# Patient Record
Sex: FEMALE | Race: WHITE | NOT HISPANIC OR LATINO | Employment: FULL TIME | URBAN - METROPOLITAN AREA
[De-identification: names, ages, dates, MRNs, and addresses within clinical notes are randomized per-mention and may not be internally consistent; named-entity substitution may affect disease eponyms.]

---

## 2019-09-09 ENCOUNTER — APPOINTMENT (EMERGENCY)
Dept: RADIOLOGY | Facility: HOSPITAL | Age: 19
End: 2019-09-09
Payer: COMMERCIAL

## 2019-09-09 ENCOUNTER — HOSPITAL ENCOUNTER (EMERGENCY)
Facility: HOSPITAL | Age: 19
Discharge: HOME | End: 2019-09-09
Attending: EMERGENCY MEDICINE
Payer: COMMERCIAL

## 2019-09-09 VITALS
RESPIRATION RATE: 16 BRPM | BODY MASS INDEX: 18.96 KG/M2 | TEMPERATURE: 98.2 F | WEIGHT: 118 LBS | HEART RATE: 91 BPM | SYSTOLIC BLOOD PRESSURE: 126 MMHG | OXYGEN SATURATION: 100 % | DIASTOLIC BLOOD PRESSURE: 75 MMHG | HEIGHT: 66 IN

## 2019-09-09 DIAGNOSIS — R07.9 CHEST PAIN: Primary | ICD-10-CM

## 2019-09-09 LAB
ALBUMIN SERPL BCP-MCNC: 4.5 G/DL (ref 3.5–5)
ALP SERPL-CCNC: 63 U/L (ref 46–384)
ALT SERPL W P-5'-P-CCNC: 12 U/L (ref 12–78)
ANION GAP SERPL CALCULATED.3IONS-SCNC: 9 MMOL/L (ref 4–13)
AST SERPL W P-5'-P-CCNC: 15 U/L (ref 5–45)
ATRIAL RATE: 85 BPM
BASOPHILS # BLD AUTO: 0.04 THOUSANDS/ΜL (ref 0–0.1)
BASOPHILS NFR BLD AUTO: 1 % (ref 0–1)
BILIRUB SERPL-MCNC: 0.3 MG/DL (ref 0.2–1)
BUN SERPL-MCNC: 9 MG/DL (ref 5–25)
CALCIUM SERPL-MCNC: 9.7 MG/DL (ref 8.3–10.1)
CHLORIDE SERPL-SCNC: 103 MMOL/L (ref 100–108)
CO2 SERPL-SCNC: 28 MMOL/L (ref 21–32)
CREAT SERPL-MCNC: 0.76 MG/DL (ref 0.6–1.3)
DEPRECATED D DIMER PPP: 317 NG/ML (FEU)
EOSINOPHIL # BLD AUTO: 0.01 THOUSAND/ΜL (ref 0–0.61)
EOSINOPHIL NFR BLD AUTO: 0 % (ref 0–6)
ERYTHROCYTE [DISTWIDTH] IN BLOOD BY AUTOMATED COUNT: 11.9 % (ref 11.6–15.1)
EXT PREG TEST URINE: NEGATIVE
EXT. CONTROL ED NAV: NORMAL
GFR SERPL CREATININE-BSD FRML MDRD: 114 ML/MIN/1.73SQ M
GLUCOSE SERPL-MCNC: 98 MG/DL (ref 65–140)
HCT VFR BLD AUTO: 46.1 % (ref 34.8–46.1)
HGB BLD-MCNC: 15.2 G/DL (ref 11.5–15.4)
IMM GRANULOCYTES # BLD AUTO: 0.02 THOUSAND/UL (ref 0–0.2)
IMM GRANULOCYTES NFR BLD AUTO: 0 % (ref 0–2)
LYMPHOCYTES # BLD AUTO: 1.94 THOUSANDS/ΜL (ref 0.6–4.47)
LYMPHOCYTES NFR BLD AUTO: 28 % (ref 14–44)
MCH RBC QN AUTO: 31.7 PG (ref 26.8–34.3)
MCHC RBC AUTO-ENTMCNC: 33 G/DL (ref 31.4–37.4)
MCV RBC AUTO: 96 FL (ref 82–98)
MONOCYTES # BLD AUTO: 0.35 THOUSAND/ΜL (ref 0.17–1.22)
MONOCYTES NFR BLD AUTO: 5 % (ref 4–12)
NEUTROPHILS # BLD AUTO: 4.52 THOUSANDS/ΜL (ref 1.85–7.62)
NEUTS SEG NFR BLD AUTO: 66 % (ref 43–75)
NRBC BLD AUTO-RTO: 0 /100 WBCS
P AXIS: 45 DEGREES
PLATELET # BLD AUTO: 333 THOUSANDS/UL (ref 149–390)
PMV BLD AUTO: 9.8 FL (ref 8.9–12.7)
POTASSIUM SERPL-SCNC: 4.1 MMOL/L (ref 3.5–5.3)
PR INTERVAL: 140 MS
PROT SERPL-MCNC: 8.3 G/DL (ref 6.4–8.2)
QRS AXIS: 76 DEGREES
QRSD INTERVAL: 72 MS
QT INTERVAL: 346 MS
QTC INTERVAL: 411 MS
RBC # BLD AUTO: 4.79 MILLION/UL (ref 3.81–5.12)
SODIUM SERPL-SCNC: 140 MMOL/L (ref 136–145)
T WAVE AXIS: 62 DEGREES
TROPONIN I SERPL-MCNC: <0.02 NG/ML
VENTRICULAR RATE: 85 BPM
WBC # BLD AUTO: 6.88 THOUSAND/UL (ref 4.31–10.16)

## 2019-09-09 PROCEDURE — 93005 ELECTROCARDIOGRAM TRACING: CPT

## 2019-09-09 PROCEDURE — 84484 ASSAY OF TROPONIN QUANT: CPT | Performed by: EMERGENCY MEDICINE

## 2019-09-09 PROCEDURE — 93010 ELECTROCARDIOGRAM REPORT: CPT | Performed by: INTERNAL MEDICINE

## 2019-09-09 PROCEDURE — 36415 COLL VENOUS BLD VENIPUNCTURE: CPT | Performed by: EMERGENCY MEDICINE

## 2019-09-09 PROCEDURE — 85025 COMPLETE CBC W/AUTO DIFF WBC: CPT | Performed by: EMERGENCY MEDICINE

## 2019-09-09 PROCEDURE — 71046 X-RAY EXAM CHEST 2 VIEWS: CPT

## 2019-09-09 PROCEDURE — 99285 EMERGENCY DEPT VISIT HI MDM: CPT

## 2019-09-09 PROCEDURE — 99283 EMERGENCY DEPT VISIT LOW MDM: CPT | Performed by: EMERGENCY MEDICINE

## 2019-09-09 PROCEDURE — 85379 FIBRIN DEGRADATION QUANT: CPT | Performed by: EMERGENCY MEDICINE

## 2019-09-09 PROCEDURE — 81025 URINE PREGNANCY TEST: CPT | Performed by: EMERGENCY MEDICINE

## 2019-09-09 PROCEDURE — 80053 COMPREHEN METABOLIC PANEL: CPT | Performed by: EMERGENCY MEDICINE

## 2019-09-09 RX ORDER — IBUPROFEN 600 MG/1
600 TABLET ORAL ONCE
Status: DISCONTINUED | OUTPATIENT
Start: 2019-09-09 | End: 2019-09-09 | Stop reason: HOSPADM

## 2019-09-09 RX ORDER — NAPROXEN 375 MG/1
375 TABLET ORAL 2 TIMES DAILY WITH MEALS
Qty: 20 TABLET | Refills: 0 | Status: SHIPPED | OUTPATIENT
Start: 2019-09-09 | End: 2019-10-09 | Stop reason: ALTCHOICE

## 2019-09-09 RX ORDER — KETOROLAC TROMETHAMINE 30 MG/ML
15 INJECTION, SOLUTION INTRAMUSCULAR; INTRAVENOUS ONCE
Status: DISCONTINUED | OUTPATIENT
Start: 2019-09-09 | End: 2019-09-09

## 2019-09-09 NOTE — ED PROVIDER NOTES
History  Chief Complaint   Patient presents with    Chest Pain     Pt reports intermittent chest pain ongoing x2 weeks, radiating to left chest  Pt reports chest pain sudden onset after first time smoking barb  21 y/o female presents to the ED for chest pain x 2 weeks  Patient states that she has an achy pain in the center of her chest- states that initially it was intermittent and now it has been constant for 3 days  She denies any radiation of the pain  States that movement and deep breath worsen it  She reports that the pain started after smoking marijuana from a vape 2 weeks ago  She states that this is the one and only time she has smoking marijuana and used a vape  She states that she initially had cough, which has since resolved  Denies any f/c, sob, abd pain, n/v, leg swelling, urinary symtpoms, or d/c  Has not tried anything for the symptoms  She denies any cardiac hx or hx of sudden death in the family  Denies any recent travel, prlonged immobilization, recent surgery, history of PE/DVT, family history of blood clotting disorder, or hormone use  She denies any other complaints  History provided by:  Patient  Chest Pain   Pain location:  Substernal area  Pain quality: aching    Pain radiates to:  Does not radiate  Pain radiates to the back: no    Pain severity:  Mild  Onset quality:  Sudden  Duration:  2 weeks  Timing: constant for 3 days   Progression:  Unchanged  Chronicity:  New  Relieved by:  None tried  Worsened by:  Deep breathing and movement  Ineffective treatments:  None tried  Associated symptoms: cough    Associated symptoms: no abdominal pain, no anxiety, no fever, no headache, no lower extremity edema, no nausea, no numbness, no shortness of breath, not vomiting and no weakness        None       History reviewed  No pertinent past medical history  History reviewed  No pertinent surgical history  History reviewed  No pertinent family history    I have reviewed and agree with the history as documented  Social History     Tobacco Use    Smoking status: Never Smoker    Smokeless tobacco: Never Used   Substance Use Topics    Alcohol use: Not Currently    Drug use: Not Currently        Review of Systems   Constitutional: Negative for chills and fever  HENT: Negative for congestion, ear pain and sore throat  Eyes: Negative for pain and visual disturbance  Respiratory: Positive for cough  Negative for shortness of breath and wheezing  Cardiovascular: Positive for chest pain  Negative for leg swelling  Gastrointestinal: Negative for abdominal pain, diarrhea, nausea and vomiting  Genitourinary: Negative for dysuria, frequency, hematuria and urgency  Musculoskeletal: Negative for neck pain and neck stiffness  Skin: Negative for rash and wound  Neurological: Negative for weakness, numbness and headaches  Psychiatric/Behavioral: Negative for agitation and confusion  All other systems reviewed and are negative  Physical Exam  Physical Exam   Constitutional: She is oriented to person, place, and time  She appears well-developed and well-nourished  HENT:   Head: Normocephalic and atraumatic  Eyes: Pupils are equal, round, and reactive to light  EOM are normal    Neck: Normal range of motion  Neck supple  Cardiovascular: Normal rate and regular rhythm  Pulmonary/Chest: Effort normal and breath sounds normal  She has no decreased breath sounds  She has no wheezes  She has no rhonchi  She has no rales  She exhibits tenderness  Substernal chest tenderness    Abdominal: Soft  Bowel sounds are normal  She exhibits no distension  There is no tenderness  Musculoskeletal: Normal range of motion  Neurological: She is alert and oriented to person, place, and time  No focal deficits   Skin: Skin is warm and dry  Nursing note and vitals reviewed        Vital Signs  ED Triage Vitals [09/09/19 0908]   Temperature Pulse Respirations Blood Pressure SpO2   98 2 °F (36 8 °C) 105 15 139/91 100 %      Temp Source Heart Rate Source Patient Position - Orthostatic VS BP Location FiO2 (%)   Oral Monitor Sitting Left arm --      Pain Score       6           Vitals:    09/09/19 0908 09/09/19 1016   BP: 139/91 126/75   Pulse: 105 91   Patient Position - Orthostatic VS: Sitting Sitting         Visual Acuity      ED Medications  Medications   ibuprofen (MOTRIN) tablet 600 mg (600 mg Oral Not Given 9/9/19 1109)       Diagnostic Studies  Results Reviewed     Procedure Component Value Units Date/Time    D-dimer, quantitative [[id number]]  (Normal) Collected:  09/09/19 1025    Lab Status:  Final result Specimen:  Blood from Arm, Left Updated:  09/09/19 1044     D-Dimer, Quant 317 ng/ml (FEU)     POCT pregnancy, urine [[id number]]  (Normal) Resulted:  09/09/19 1021    Lab Status:  Final result Updated:  09/09/19 1021     EXT PREG TEST UR (Ref: Negative) Negative     Control Valid    Troponin I [[id number]]  (Normal) Collected:  09/09/19 0926    Lab Status:  Final result Specimen:  Blood from Arm, Right Updated:  09/09/19 0958     Troponin I <0 02 ng/mL     Comprehensive metabolic panel [[id number]]  (Abnormal) Collected:  09/09/19 0926    Lab Status:  Final result Specimen:  Blood from Arm, Right Updated:  09/09/19 0955     Sodium 140 mmol/L      Potassium 4 1 mmol/L      Chloride 103 mmol/L      CO2 28 mmol/L      ANION GAP 9 mmol/L      BUN 9 mg/dL      Creatinine 0 76 mg/dL      Glucose 98 mg/dL      Calcium 9 7 mg/dL      AST 15 U/L      ALT 12 U/L      Alkaline Phosphatase 63 U/L      Total Protein 8 3 g/dL      Albumin 4 5 g/dL      Total Bilirubin 0 30 mg/dL      eGFR 114 ml/min/1 73sq m     Narrative:       [place] guidelines for Chronic Kidney Disease (CKD):     Stage 1 with normal or high GFR (GFR > 90 mL/min/1 73 square meters)    Stage 2 Mild CKD (GFR = 60-89 mL/min/1 73 square meters)    Stage 3A Moderate CKD (GFR = 45-59 mL/min/1 73 square meters)   Stage 3B Moderate CKD (GFR = 30-44 mL/min/1 73 square meters)    Stage 4 Severe CKD (GFR = 15-29 mL/min/1 73 square meters)    Stage 5 End Stage CKD (GFR <15 mL/min/1 73 square meters)  Note: GFR calculation is accurate only with a steady state creatinine    CBC and differential [[id number]] Collected:  09/09/19 0926    Lab Status:  Final result Specimen:  Blood from Arm, Right Updated:  09/09/19 0934     WBC 6 88 Thousand/uL      RBC 4 79 Million/uL      Hemoglobin 15 2 g/dL      Hematocrit 46 1 %      MCV 96 fL      MCH 31 7 pg      MCHC 33 0 g/dL      RDW 11 9 %      MPV 9 8 fL      Platelets 256 Thousands/uL      nRBC 0 /100 WBCs      Neutrophils Relative 66 %      Immat GRANS % 0 %      Lymphocytes Relative 28 %      Monocytes Relative 5 %      Eosinophils Relative 0 %      Basophils Relative 1 %      Neutrophils Absolute 4 52 Thousands/µL      Immature Grans Absolute 0 02 Thousand/uL      Lymphocytes Absolute 1 94 Thousands/µL      Monocytes Absolute 0 35 Thousand/µL      Eosinophils Absolute 0 01 Thousand/µL      Basophils Absolute 0 04 Thousands/µL                  XR chest 2 views   ED Interpretation by Mitch Addison DO (09/09 1100)   NAP                  Procedures  ECG 12 Lead Documentation Only  Date/Time: 9/9/2019 9:30 AM  Performed by: Mitch Addison DO  Authorized by: Mitch Addison DO     Indications / Diagnosis:  Chest pain   Patient location:  ED  Previous ECG:     Previous ECG:  Unavailable  Rate:     ECG rate:  85    ECG rate assessment: normal    Rhythm:     Rhythm: sinus rhythm    Ectopy:     Ectopy: none    QRS:     QRS axis:  Normal    QRS intervals:  Normal  ST segments:     ST segments:  Normal  T waves:     T waves: normal             ED Course  ED Course as of Sep 09 1132   Mon Sep 09, 2019   1049 D-DIMER QUANTITATIVE: 317         HEART Risk Score      Most Recent Value   History  0 Filed at: 09/09/2019 1129   ECG  0 Filed at: 09/09/2019 1129   Age  0 Filed at: 09/09/2019 1129   Risk Factors  0 Filed at: 09/09/2019 1129   Troponin  0 Filed at: 09/09/2019 1129   Heart Score Risk Calculator   History  0 Filed at: 09/09/2019 1129   ECG  0 Filed at: 09/09/2019 1129   Age  0 Filed at: 09/09/2019 1129   Risk Factors  0 Filed at: 09/09/2019 1129   Troponin  0 Filed at: 09/09/2019 1129   HEART Score  0 Filed at: 09/09/2019 1129   HEART Score  0 Filed at: 09/09/2019 1129            PERC Rule for PE      Most Recent Value   PERC Rule for PE   Age >=50  0 Filed at: 09/09/2019 1129   HR >=100  1 Filed at: 09/09/2019 1129   O2 Sat on room air < 95%  0 Filed at: 09/09/2019 1129   History of PE or DVT  0 Filed at: 09/09/2019 1129   Recent trauma or surgery  0 Filed at: 09/09/2019 1129   Hemoptysis  0 Filed at: 09/09/2019 1129   Exogenous estrogen  0 Filed at: 09/09/2019 1129   Unilateral leg swelling  0 Filed at: 09/09/2019 1129   PERC Rule for PE Results  1 Filed at: 09/09/2019 1129                      MDM  Number of Diagnoses or Management Options  Chest pain: new and requires workup  Diagnosis management comments: Patient with chest pain- likely 2/2 musculoskeletal pain- will get cardiac workup, ekg, and ddimer  Will give nsaids for symptom relief  Patient reevaluated and feels improved  Patient updated on results of tests  Discharge instructions given including medications, follow-up, and return precautions  Patient demonstrates verbal understanding and agrees with plan         Amount and/or Complexity of Data Reviewed  Clinical lab tests: ordered and reviewed  Tests in the radiology section of CPT®: ordered and reviewed  Tests in the medicine section of CPT®: ordered and reviewed  Discussion of test results with the performing providers: yes  Decide to obtain previous medical records or to obtain history from someone other than the patient: yes  Obtain history from someone other than the patient: yes  Review and summarize past medical records: yes  Discuss the patient with other providers: yes  Independent visualization of images, tracings, or specimens: yes    Patient Progress  Patient progress: improved      Disposition  Final diagnoses:   Chest pain     Time reflects when diagnosis was documented in both MDM as applicable and the Disposition within this note     Time User Action Codes Description Comment    9/9/2019 11:00 AM Sarah Moreno Add [R07 9] Chest pain       ED Disposition     ED Disposition Condition Date/Time Comment    Discharge Stable Mon Sep 9, 2019 11:00 AM Eusebio Maguire discharge to home/self care  Follow-up Information     Follow up With Specialties Details Why Contact Info Additional Information    Susan Alston MD Internal Medicine Call in 1 day for follow up within 2-3 days  [address] [place] [address]  [phone]       [employer]  Call  to establish a family doctor [phone]       [facility] Emergency Medicine Go to  immediately for any new or worsening symptoms  [address] [facility] [id number]  [address] [place] [address], [address] [place] [address], Critical access hospital          There are no discharge medications for this patient  No discharge procedures on file      ED Provider  Electronically Signed by           Nessa Escudero DO  09/09/19 4591

## 2019-09-28 ENCOUNTER — HOSPITAL ENCOUNTER (EMERGENCY)
Facility: HOSPITAL | Age: 19
Discharge: HOME | End: 2019-09-29
Attending: EMERGENCY MEDICINE | Admitting: EMERGENCY MEDICINE
Payer: COMMERCIAL

## 2019-09-28 ENCOUNTER — APPOINTMENT (EMERGENCY)
Dept: RADIOLOGY | Facility: HOSPITAL | Age: 19
End: 2019-09-28
Payer: COMMERCIAL

## 2019-09-28 VITALS
WEIGHT: 123.24 LBS | DIASTOLIC BLOOD PRESSURE: 88 MMHG | HEIGHT: 66 IN | OXYGEN SATURATION: 100 % | HEART RATE: 108 BPM | RESPIRATION RATE: 16 BRPM | BODY MASS INDEX: 19.81 KG/M2 | SYSTOLIC BLOOD PRESSURE: 120 MMHG | TEMPERATURE: 97.9 F

## 2019-09-28 DIAGNOSIS — J40 BRONCHITIS: ICD-10-CM

## 2019-09-28 DIAGNOSIS — R07.89 CHEST WALL PAIN: Primary | ICD-10-CM

## 2019-09-28 LAB
ALBUMIN SERPL BCP-MCNC: 4.1 G/DL (ref 3.5–5)
ALP SERPL-CCNC: 63 U/L (ref 46–384)
ALT SERPL W P-5'-P-CCNC: 17 U/L (ref 12–78)
AMPHETAMINES SERPL QL SCN: NEGATIVE
ANION GAP SERPL CALCULATED.3IONS-SCNC: 11 MMOL/L (ref 4–13)
AST SERPL W P-5'-P-CCNC: 15 U/L (ref 5–45)
BARBITURATES UR QL: NEGATIVE
BASOPHILS # BLD AUTO: 0.06 THOUSANDS/ΜL (ref 0–0.1)
BASOPHILS NFR BLD AUTO: 0 % (ref 0–1)
BENZODIAZ UR QL: NEGATIVE
BILIRUB SERPL-MCNC: 0.3 MG/DL (ref 0.2–1)
BUN SERPL-MCNC: 11 MG/DL (ref 5–25)
CALCIUM SERPL-MCNC: 9.5 MG/DL (ref 8.3–10.1)
CHLORIDE SERPL-SCNC: 104 MMOL/L (ref 100–108)
CO2 SERPL-SCNC: 26 MMOL/L (ref 21–32)
COCAINE UR QL: NEGATIVE
CREAT SERPL-MCNC: 0.82 MG/DL (ref 0.6–1.3)
DEPRECATED D DIMER PPP: 381 NG/ML (FEU)
EOSINOPHIL # BLD AUTO: 0.06 THOUSAND/ΜL (ref 0–0.61)
EOSINOPHIL NFR BLD AUTO: 0 % (ref 0–6)
ERYTHROCYTE [DISTWIDTH] IN BLOOD BY AUTOMATED COUNT: 11.8 % (ref 11.6–15.1)
GFR SERPL CREATININE-BSD FRML MDRD: 104 ML/MIN/1.73SQ M
GLUCOSE SERPL-MCNC: 95 MG/DL (ref 65–140)
HCT VFR BLD AUTO: 40.6 % (ref 34.8–46.1)
HGB BLD-MCNC: 13.1 G/DL (ref 11.5–15.4)
IMM GRANULOCYTES # BLD AUTO: 0.05 THOUSAND/UL (ref 0–0.2)
IMM GRANULOCYTES NFR BLD AUTO: 0 % (ref 0–2)
LYMPHOCYTES # BLD AUTO: 2.83 THOUSANDS/ΜL (ref 0.6–4.47)
LYMPHOCYTES NFR BLD AUTO: 20 % (ref 14–44)
MAGNESIUM SERPL-MCNC: 2.2 MG/DL (ref 1.6–2.6)
MCH RBC QN AUTO: 31.1 PG (ref 26.8–34.3)
MCHC RBC AUTO-ENTMCNC: 32.3 G/DL (ref 31.4–37.4)
MCV RBC AUTO: 96 FL (ref 82–98)
METHADONE UR QL: NEGATIVE
MONOCYTES # BLD AUTO: 0.99 THOUSAND/ΜL (ref 0.17–1.22)
MONOCYTES NFR BLD AUTO: 7 % (ref 4–12)
NEUTROPHILS # BLD AUTO: 10.18 THOUSANDS/ΜL (ref 1.85–7.62)
NEUTS SEG NFR BLD AUTO: 73 % (ref 43–75)
NRBC BLD AUTO-RTO: 0 /100 WBCS
OPIATES UR QL SCN: NEGATIVE
PCP UR QL: NEGATIVE
PHOSPHATE SERPL-MCNC: 4 MG/DL (ref 2.7–4.5)
PLATELET # BLD AUTO: 302 THOUSANDS/UL (ref 149–390)
PMV BLD AUTO: 9.7 FL (ref 8.9–12.7)
POTASSIUM SERPL-SCNC: 4 MMOL/L (ref 3.5–5.3)
PROT SERPL-MCNC: 7.4 G/DL (ref 6.4–8.2)
RBC # BLD AUTO: 4.21 MILLION/UL (ref 3.81–5.12)
SODIUM SERPL-SCNC: 141 MMOL/L (ref 136–145)
THC UR QL: NEGATIVE
TROPONIN I SERPL-MCNC: <0.02 NG/ML
TSH SERPL DL<=0.05 MIU/L-ACNC: 1.3 UIU/ML (ref 0.46–3.98)
WBC # BLD AUTO: 14.17 THOUSAND/UL (ref 4.31–10.16)

## 2019-09-28 PROCEDURE — 84100 ASSAY OF PHOSPHORUS: CPT | Performed by: EMERGENCY MEDICINE

## 2019-09-28 PROCEDURE — 99285 EMERGENCY DEPT VISIT HI MDM: CPT

## 2019-09-28 PROCEDURE — 84443 ASSAY THYROID STIM HORMONE: CPT | Performed by: EMERGENCY MEDICINE

## 2019-09-28 PROCEDURE — 71046 X-RAY EXAM CHEST 2 VIEWS: CPT

## 2019-09-28 PROCEDURE — 83735 ASSAY OF MAGNESIUM: CPT | Performed by: EMERGENCY MEDICINE

## 2019-09-28 PROCEDURE — 85379 FIBRIN DEGRADATION QUANT: CPT | Performed by: EMERGENCY MEDICINE

## 2019-09-28 PROCEDURE — 80307 DRUG TEST PRSMV CHEM ANLYZR: CPT | Performed by: EMERGENCY MEDICINE

## 2019-09-28 PROCEDURE — 96374 THER/PROPH/DIAG INJ IV PUSH: CPT

## 2019-09-28 PROCEDURE — 85025 COMPLETE CBC W/AUTO DIFF WBC: CPT | Performed by: EMERGENCY MEDICINE

## 2019-09-28 PROCEDURE — 80053 COMPREHEN METABOLIC PANEL: CPT | Performed by: EMERGENCY MEDICINE

## 2019-09-28 PROCEDURE — 36415 COLL VENOUS BLD VENIPUNCTURE: CPT | Performed by: EMERGENCY MEDICINE

## 2019-09-28 PROCEDURE — 96361 HYDRATE IV INFUSION ADD-ON: CPT

## 2019-09-28 PROCEDURE — 99285 EMERGENCY DEPT VISIT HI MDM: CPT | Performed by: EMERGENCY MEDICINE

## 2019-09-28 PROCEDURE — 84484 ASSAY OF TROPONIN QUANT: CPT | Performed by: EMERGENCY MEDICINE

## 2019-09-28 RX ORDER — KETOROLAC TROMETHAMINE 30 MG/ML
15 INJECTION, SOLUTION INTRAMUSCULAR; INTRAVENOUS ONCE
Status: COMPLETED | OUTPATIENT
Start: 2019-09-28 | End: 2019-09-28

## 2019-09-28 RX ADMIN — SODIUM CHLORIDE 1000 ML: 0.9 INJECTION, SOLUTION INTRAVENOUS at 22:14

## 2019-09-28 RX ADMIN — KETOROLAC TROMETHAMINE 15 MG: 30 INJECTION, SOLUTION INTRAMUSCULAR at 22:15

## 2019-09-29 NOTE — ED PROVIDER NOTES
History  Chief Complaint   Patient presents with    Chest Pain     Patient c/o mid-sternal chest pain x's 3 days  Radiates down left arm  Patient notes has been experiencing chest pain for past 3 months, but recently worsened over the past 3 days  Patient is a 50-year-old female  She presents to the emergency room for evaluation of chest pain  She has had a dull left-sided chest pain for about 3 months now  She denies injury  For the last 3 days there has been a sharp pain  It is worse with deep breath  It is worse with movement  She does have a cough  No hemoptysis  The cough is not productive  No fever or chills  She denies any calf pain or unilateral leg swelling  She has no cardiac risk factors  No hypertension, diabetes or hypercholesterolemia  No cocaine use  No smoking  No significant family history of coronary artery disease  She does not have any history of thromboembolic disease  She denies any abdominal pain  Symptoms are moderate in intensity  No relieving factors  The sharp pain in her chest is intermittent  Prior to Admission Medications   Prescriptions Last Dose Informant Patient Reported? Taking?   naproxen (NAPROSYN) 375 mg tablet   No No   Sig: Take 1 tablet (375 mg total) by mouth 2 (two) times a day with meals      Facility-Administered Medications: None       History reviewed  No pertinent past medical history  History reviewed  No pertinent surgical history  History reviewed  No pertinent family history  I have reviewed and agree with the history as documented  Social History     Tobacco Use    Smoking status: Never Smoker    Smokeless tobacco: Never Used   Substance Use Topics    Alcohol use: Not Currently    Drug use: Not Currently        Review of Systems   Constitutional: Negative for chills and fever  HENT: Negative for rhinorrhea and sore throat  Eyes: Negative for pain, redness and visual disturbance     Respiratory: Positive for cough  Negative for shortness of breath  Cardiovascular: Positive for chest pain  Negative for leg swelling  Gastrointestinal: Negative for abdominal pain, diarrhea and vomiting  Endocrine: Negative for polydipsia and polyuria  Genitourinary: Negative for dysuria, frequency, hematuria, vaginal bleeding and vaginal discharge  Musculoskeletal: Negative for back pain and neck pain  Skin: Negative for rash and wound  Allergic/Immunologic: Negative for immunocompromised state  Neurological: Negative for weakness, numbness and headaches  Hematological: Does not bruise/bleed easily  Psychiatric/Behavioral: Negative for hallucinations and suicidal ideas  All other systems reviewed and are negative  Physical Exam  Physical Exam   Constitutional: She is oriented to person, place, and time  She appears well-developed and well-nourished  Non-toxic appearance  She does not appear ill  No distress  HENT:   Head: Normocephalic and atraumatic  Mouth/Throat: Oropharynx is clear and moist    Eyes: Conjunctivae are normal  Right eye exhibits no discharge  Left eye exhibits no discharge  No scleral icterus  Neck: Normal range of motion  Neck supple  Cardiovascular: Normal rate, regular rhythm, normal heart sounds and intact distal pulses  Exam reveals no gallop and no friction rub  No murmur heard  Pulmonary/Chest: Effort normal and breath sounds normal  No stridor  No respiratory distress  She has no wheezes  She has no rales  There is left chest wall tenderness  Abdominal: Soft  Bowel sounds are normal  She exhibits no distension  There is no tenderness  There is no rebound and no guarding  Musculoskeletal: Normal range of motion  She exhibits no edema, tenderness or deformity  Right lower leg: She exhibits no tenderness and no edema  Left lower leg: She exhibits no tenderness and no edema  No CVA tenderness  No calf tenderness/palpable cords     Neurological: She is alert and oriented to person, place, and time  She has normal strength  No sensory deficit  GCS eye subscore is 4  GCS verbal subscore is 5  GCS motor subscore is 6  Skin: Skin is warm and dry  No rash noted  Psychiatric: She has a normal mood and affect  Her behavior is normal    Vitals reviewed  Vital Signs  ED Triage Vitals [09/28/19 1955]   Temperature Pulse Respirations Blood Pressure SpO2   97 9 °F (36 6 °C) (!) 114 16 143/88 100 %      Temp Source Heart Rate Source Patient Position - Orthostatic VS BP Location FiO2 (%)   Oral Monitor Sitting Left arm --      Pain Score       5           Vitals:    09/28/19 1955 09/28/19 2212 09/28/19 2230   BP: 143/88 120/88    Pulse: (!) 114 (!) 106 (!) 108   Patient Position - Orthostatic VS: Sitting Lying          Visual Acuity      ED Medications  Medications   sodium chloride 0 9 % bolus 1,000 mL (1,000 mL Intravenous New Bag 9/28/19 2214)   ketorolac (TORADOL) injection 15 mg (15 mg Intravenous Given 9/28/19 2215)       Diagnostic Studies  Results Reviewed     Procedure Component Value Units Date/Time    TSH [[id number]]  (Normal) Collected:  09/28/19 2212    Lab Status:  Final result Specimen:  Blood from Arm, Right Updated:  09/28/19 2258     TSH 3RD GENERATON 1 304 uIU/mL     Narrative:       Patients undergoing fluorescein dye angiography may retain small amounts of fluorescein in the body for 48-72 hours post procedure  Samples containing fluorescein can produce falsely depressed TSH values  If the patient had this procedure,a specimen should be resubmitted post fluorescein clearance        Magnesium [[id number]]  (Normal) Collected:  09/28/19 2212    Lab Status:  Final result Specimen:  Blood from Arm, Right Updated:  09/28/19 2258     Magnesium 2 2 mg/dL     Phosphorus [[id number]]  (Normal) Collected:  09/28/19 2212    Lab Status:  Final result Specimen:  Blood from Arm, Right Updated:  09/28/19 2258     Phosphorus 4 0 mg/dL     Troponin I [[id number]]  (Normal) Collected:  09/28/19 2212    Lab Status:  Final result Specimen:  Blood from Arm, Right Updated:  09/28/19 2253     Troponin I <0 02 ng/mL     Comprehensive metabolic panel [[id number]] Collected:  09/28/19 2212    Lab Status:  Final result Specimen:  Blood from Arm, Right Updated:  09/28/19 2250     Sodium 141 mmol/L      Potassium 4 0 mmol/L      Chloride 104 mmol/L      CO2 26 mmol/L      ANION GAP 11 mmol/L      BUN 11 mg/dL      Creatinine 0 82 mg/dL      Glucose 95 mg/dL      Calcium 9 5 mg/dL      AST 15 U/L      ALT 17 U/L      Alkaline Phosphatase 63 U/L      Total Protein 7 4 g/dL      Albumin 4 1 g/dL      Total Bilirubin 0 30 mg/dL      eGFR 104 ml/min/1 73sq m     Narrative:       [place] guidelines for Chronic Kidney Disease (CKD):     Stage 1 with normal or high GFR (GFR > 90 mL/min/1 73 square meters)    Stage 2 Mild CKD (GFR = 60-89 mL/min/1 73 square meters)    Stage 3A Moderate CKD (GFR = 45-59 mL/min/1 73 square meters)    Stage 3B Moderate CKD (GFR = 30-44 mL/min/1 73 square meters)    Stage 4 Severe CKD (GFR = 15-29 mL/min/1 73 square meters)    Stage 5 End Stage CKD (GFR <15 mL/min/1 73 square meters)  Note: GFR calculation is accurate only with a steady state creatinine    Rapid drug screen, urine [[id number]]  (Normal) Collected:  09/28/19 2217    Lab Status:  Final result Specimen:  Urine, Clean Catch Updated:  09/28/19 2235     Amph/Meth UR Negative     Barbiturate Ur Negative     Benzodiazepine Urine Negative     Cocaine Urine Negative     Methadone Urine Negative     Opiate Urine Negative     PCP Ur Negative     THC Urine Negative    Narrative:       FOR MEDICAL PURPOSES ONLY  IF CONFIRMATION NEEDED PLEASE CONTACT THE LAB WITHIN 5 DAYS      Drug Screen Cutoff Levels:  AMPHETAMINE/METHAMPHETAMINES  1000 ng/mL  BARBITURATES     200 ng/mL  BENZODIAZEPINES     200 ng/mL  COCAINE      300 ng/mL  METHADONE      300 ng/mL  OPIATES      300 ng/mL  PHENCYCLIDINE     25 ng/mL  THC       50 ng/mL      D-Dimer [[id number]]  (Normal) Collected:  09/28/19 2212    Lab Status:  Final result Specimen:  Blood from Arm, Right Updated:  09/28/19 2233     D-Dimer, Quant 381 ng/ml (FEU)     CBC and differential [[id number]]  (Abnormal) Collected:  09/28/19 2212    Lab Status:  Final result Specimen:  Blood from Arm, Right Updated:  09/28/19 2219     WBC 14 17 Thousand/uL      RBC 4 21 Million/uL      Hemoglobin 13 1 g/dL      Hematocrit 40 6 %      MCV 96 fL      MCH 31 1 pg      MCHC 32 3 g/dL      RDW 11 8 %      MPV 9 7 fL      Platelets 430 Thousands/uL      nRBC 0 /100 WBCs      Neutrophils Relative 73 %      Immat GRANS % 0 %      Lymphocytes Relative 20 %      Monocytes Relative 7 %      Eosinophils Relative 0 %      Basophils Relative 0 %      Neutrophils Absolute 10 18 Thousands/µL      Immature Grans Absolute 0 05 Thousand/uL      Lymphocytes Absolute 2 83 Thousands/µL      Monocytes Absolute 0 99 Thousand/µL      Eosinophils Absolute 0 06 Thousand/µL      Basophils Absolute 0 06 Thousands/µL     POCT pregnancy, urine [[id number]]     Lab Status:  No result                  XR chest 2 views   ED Interpretation by Freedom Zacarias MD (09/28 4320)   No infiltrates  No widened mediastinum  No pneumothorax  No acute disease                   Procedures  ECG 12 Lead Documentation Only  Date/Time: 9/28/2019 9:51 PM  Performed by: Freedom Zacarias MD  Authorized by: Freedom Zacarias MD     ECG reviewed by me, the ED Provider: yes    Patient location:  ED  Interpretation:     Interpretation: non-specific    Rate:     ECG rate assessment: tachycardic    Rhythm:     Rhythm: sinus rhythm    Ectopy:     Ectopy: none    QRS:     QRS axis:  Normal  Conduction:     Conduction: normal    ST segments:     ST segments:  Normal  T waves:     T waves: normal             ED Course         HEART Risk Score      Most Recent Value   History  0 Filed at: 09/28/2019 2157   ECG  0 Filed at: 09/28/2019 2157   Age  0 Filed at: 09/28/2019 2157   Risk Factors  0 Filed at: 09/28/2019 2157   Troponin  0 Filed at: 09/28/2019 2157   Heart Score Risk Calculator   History  0 Filed at: 09/28/2019 2157   ECG  0 Filed at: 09/28/2019 2157   Age  0 Filed at: 09/28/2019 2157   Risk Factors  0 Filed at: 09/28/2019 2157   Troponin  0 Filed at: 09/28/2019 2157   HEART Score  0 Filed at: 09/28/2019 2157   HEART Score  0 Filed at: 09/28/2019 2157                      Davida Phalen' Criteria for PE      Most Recent Value   Wells' Criteria for PE   Clinical signs and symptoms of DVT  0 Filed at: 09/28/2019 2158   PE is primary diagnosis or equally likely  0 Filed at: 09/28/2019 2158   HR >100  1 5 Filed at: 09/28/2019 2158   Immobilization at least 3 days or Surgery in the previous 4 weeks  0 Filed at: 09/28/2019 2158   Previous, objectively diagnosed PE or DVT  0 Filed at: 09/28/2019 2158   Hemoptysis  0 Filed at: 09/28/2019 2158   Malignancy with treatment within 6 months or palliative  0 Filed at: 09/28/2019 2158   Wells' Criteria Total  1 5 Filed at: 09/28/2019 2158            MDM  Number of Diagnoses or Management Options  Diagnosis management comments: Patient is low risk for pulmonary embolism by Wells criteria  D-dimer is negative  Pulmonary embolism is unlikely  Chest x-ray negative for pneumonia or pneumothorax  There is no widened mediastinum  No back pain to suggest dissection  Patient's heart score is 0  Apart from a sinus tachycardia her EKG is normal   Troponin is negative  Acute coronary syndrome would be unlikely  This most likely is musculoskeletal   Appropriate for discharge and outpatient management  Patient did have a resting tachycardia  Etiology of this is unclear  Biggest concern would be pulmonary embolism but this is unlikely due to low wells score and negative D-dimer  White blood cell count was mildly elevated also  Patient has been sick with a cough    This likely might be due to a bronchitis  Amount and/or Complexity of Data Reviewed  Clinical lab tests: ordered and reviewed  Tests in the radiology section of CPT®: ordered and reviewed  Independent visualization of images, tracings, or specimens: yes        Disposition  Final diagnoses:   Chest wall pain   Bronchitis     Time reflects when diagnosis was documented in both MDM as applicable and the Disposition within this note     Time User Action Codes Description Comment    9/28/2019 11:37 PM [place] Asp Add [R07 89] Chest wall pain     9/28/2019 11:37 PM Adriana, [address] [place] [address] Bronchitis       ED Disposition     ED Disposition Condition Date/Time Comment    Discharge Stable Sat Sep 28, 2019 11:37 PM Eusebio Maguire discharge to home/self care  Follow-up Information     Follow up With Specialties Details Why [address]  In 1 week  [phone]            Patient's Medications   Discharge Prescriptions    No medications on file     No discharge procedures on file      ED Provider  Electronically Signed by           Be Holland MD  09/28/19 1823

## 2019-10-09 ENCOUNTER — OFFICE VISIT (OUTPATIENT)
Dept: FAMILY MEDICINE CLINIC | Facility: CLINIC | Age: 19
End: 2019-10-09
Payer: COMMERCIAL

## 2019-10-09 VITALS
HEART RATE: 87 BPM | WEIGHT: 122 LBS | BODY MASS INDEX: 19.61 KG/M2 | HEIGHT: 66 IN | OXYGEN SATURATION: 99 % | TEMPERATURE: 99 F | SYSTOLIC BLOOD PRESSURE: 116 MMHG | DIASTOLIC BLOOD PRESSURE: 84 MMHG

## 2019-10-09 DIAGNOSIS — R09.82 POST-NASAL DRIP: ICD-10-CM

## 2019-10-09 DIAGNOSIS — Z00.00 HEALTHCARE MAINTENANCE: Primary | ICD-10-CM

## 2019-10-09 PROCEDURE — 99385 PREV VISIT NEW AGE 18-39: CPT | Performed by: FAMILY MEDICINE

## 2019-10-09 RX ORDER — FLUTICASONE PROPIONATE 50 MCG
1 SPRAY, SUSPENSION (ML) NASAL DAILY
Qty: 1 BOTTLE | Refills: 0 | Status: SHIPPED | OUTPATIENT
Start: 2019-10-09 | End: 2019-12-31 | Stop reason: ALTCHOICE

## 2019-10-09 NOTE — PROGRESS NOTES
Eusebio Maguire 2000 female MRN: [id number]      ASSESSMENT/PLAN  Problem List Items Addressed This Visit        Other    Healthcare maintenance - Primary    Post-nasal drip    Relevant Medications    fluticasone (FLONASE) 50 mcg/act nasal spray        HM:  BMI WNL  BP WNL  Defers flu shot     Cough: No OM, pharyngitis, PNA on exam  Symptoms likely secondary to post nasal drip due to allergic vs viral etiology  Trial Flonase daily -- discussed appropriate administration  RTC if develop fevers, SOB  RTC in 1 year, or sooner PRN  No future appointments  SUBJECTIVE  CC: Establish Care (follow up from ER)      HPI:  Cameron Salazar is a 22 y o  female who presents to establish care  History reviewed and updated as below  Pt was seen in [facility] ED on 9/9 and 9/23 due to chest pain  With both evaluations she had negative troponin and d-dimer, benign CXR, and unremarkable CMP  Both visits, pt was thought to have MSK pain and discharged to home  Of note, pt's WBC was elevated on 9/28, and this was thought to be due to viral bronchitis  Pt continues to have cough  1 5 weeks ago   Was dry, but now intermittently productive  Worse in AM, night, when eating  (+) rhinorrhea, congestion  No ear pain, throat pain   Tried Claritin, Tylenol, Nyquil; these helped nasal symptoms but not cough   No sick contacts       Review of Systems   Constitutional: Negative for diaphoresis and unexpected weight change  HENT: Positive for congestion and rhinorrhea  Negative for ear pain and sore throat  Eyes: Negative for visual disturbance  Respiratory: Positive for cough  Negative for shortness of breath (intermittent)  Cardiovascular: Negative for chest pain, palpitations (Pt notes intermittent elevated heart rate on Apple Watch, but does not feel it and has no associated symptoms) and leg swelling  Gastrointestinal: Negative for abdominal pain, constipation and diarrhea     Genitourinary: Negative for difficulty urinating and menstrual problem  Neurological: Negative for dizziness and tremors  Historical Information   The patient history was reviewed and updated as follows:    Past Medical History:   Diagnosis Date    No pertinent past medical history      Past Surgical History:   Procedure Laterality Date    NO PAST SURGERIES       Family History   Problem Relation Age of Onset    No Known Problems Mother     Hypertension Father       Social History   Social History     Substance and Sexual Activity   Alcohol Use Not Currently     Social History     Substance and Sexual Activity   Drug Use Not Currently    Types: Marijuana     Social History     Tobacco Use   Smoking Status Never Smoker   Smokeless Tobacco Never Used   Tobacco Comment    Tried vaping twice       Medications:     Current Outpatient Medications:     fluticasone (FLONASE) 50 mcg/act nasal spray, 1 spray into each nostril daily, Disp: 1 Bottle, Rfl: 0  No Known Allergies    OBJECTIVE    Vitals:   Vitals:    10/09/19 1053   BP: 116/84   Pulse: 87   Temp: 99 °F (37 2 °C)   TempSrc: Tympanic   SpO2: 99%   Weight: 55 3 kg (122 lb)   Height: 5' 6" (1 676 m)           Physical Exam   Constitutional: She appears well-developed and well-nourished  No distress  HENT:   Head: Normocephalic and atraumatic  Right Ear: External ear normal    Left Ear: External ear normal    Nose: Nose normal    Mouth/Throat: Oropharynx is clear and moist    Eyes: Conjunctivae are normal    Neck: No thyromegaly present  Cardiovascular: Normal rate and regular rhythm  Pulmonary/Chest: Effort normal and breath sounds normal  No respiratory distress  Abdominal: Soft  Bowel sounds are normal  She exhibits no distension  There is no tenderness  Musculoskeletal: She exhibits no edema  Lymphadenopathy:     She has no cervical adenopathy  Neurological: She is alert  Skin: Skin is warm and dry  Psychiatric: She has a normal mood and affect     Vitals reviewed                   DO Jillian Partida [facility]   10/9/2019  11:19 AM

## 2019-12-31 ENCOUNTER — OFFICE VISIT (OUTPATIENT)
Dept: FAMILY MEDICINE CLINIC | Facility: CLINIC | Age: 19
End: 2019-12-31
Payer: COMMERCIAL

## 2019-12-31 VITALS
HEIGHT: 66 IN | OXYGEN SATURATION: 99 % | WEIGHT: 121 LBS | TEMPERATURE: 98.3 F | BODY MASS INDEX: 19.44 KG/M2 | DIASTOLIC BLOOD PRESSURE: 62 MMHG | SYSTOLIC BLOOD PRESSURE: 110 MMHG | HEART RATE: 87 BPM

## 2019-12-31 DIAGNOSIS — N63.0 BREAST MASS: Primary | ICD-10-CM

## 2019-12-31 PROBLEM — R09.82 POST-NASAL DRIP: Status: RESOLVED | Noted: 2019-10-09 | Resolved: 2019-12-31

## 2019-12-31 PROBLEM — Z00.00 HEALTHCARE MAINTENANCE: Status: RESOLVED | Noted: 2019-10-09 | Resolved: 2019-12-31

## 2019-12-31 PROCEDURE — 3008F BODY MASS INDEX DOCD: CPT | Performed by: FAMILY MEDICINE

## 2019-12-31 PROCEDURE — 99214 OFFICE O/P EST MOD 30 MIN: CPT | Performed by: FAMILY MEDICINE

## 2019-12-31 PROCEDURE — 1036F TOBACCO NON-USER: CPT | Performed by: FAMILY MEDICINE

## 2019-12-31 NOTE — PROGRESS NOTES
Irineo Heimlich [facility] 2000 female MRN: [id number]      ASSESSMENT/PLAN  Problem List Items Addressed This Visit        Other    Breast mass - Primary    Relevant Orders    US breast screening bilateral complete (ABUS)        B/L breast mass: Get B/L breast US to further evaluate       No future appointments  SUBJECTIVE  CC: Follow-up (lump on both breast - a bit tender )      HPI:  Andrew Gann is a 20 y o  female who presents for an acute visit due to concern for breast mass  B/L breast mass, palpable to pt   - First noted about 3 weeks ago   - L mass larger, tender   - No skin changes, dimpling  - No nipple discharge   - On menses currently -- did note that they have changed in size since she first felt them  - Never noted previously     Review of Systems   Constitutional: Negative for unexpected weight change  Genitourinary:        (+) Breast mass B/L   L>R, L tender, R non-tender   No nipple discharge  No skin changes, dimpling        Historical Information   The patient history was reviewed and updated as follows:    Past [facility] History:   [facility]   [facility]    No pertinent past medical history     Post-nasal drip 10/9/2019     Past Surgical History:   Procedure Laterality Date    NO PAST SURGERIES       Family History   Problem Relation Age of Onset    No Known Problems Mother     Hypertension Father       Social History   Social History     Substance and Sexual Activity   Alcohol Use Not Currently     Social History     Substance and Sexual Activity   Drug Use Not Currently    Types: Marijuana     Social History     Tobacco Use   Smoking Status Never Smoker   Smokeless Tobacco Never Used   Tobacco Comment    Tried vaping twice       Medications:   No current outpatient medications on file    No Known Allergies    OBJECTIVE    Vitals:   Vitals:    12/31/19 0807   BP: 110/62   Pulse: 87   Temp: 98 3 °F (36 8 °C)   SpO2: 99%   Weight: 54 9 kg (121 lb)   Height: 5' 6" (1 676 m)           Physical Exam   Constitutional: She appears well-developed and well-nourished  No distress  HENT:   Head: Normocephalic and atraumatic  Pulmonary/Chest: Effort normal  Right breast exhibits no inverted nipple, no nipple discharge, no skin change and no tenderness  Left breast exhibits no inverted nipple, no nipple discharge, no skin change and no tenderness  Anita Ramos MA, present for exam        Neurological: She is alert  Psychiatric: She has a normal mood and affect  Vitals reviewed                   DO Jillian Huitron [facility]   12/31/2019  8:22 AM